# Patient Record
Sex: MALE | Race: WHITE | NOT HISPANIC OR LATINO | Employment: FULL TIME | URBAN - METROPOLITAN AREA
[De-identification: names, ages, dates, MRNs, and addresses within clinical notes are randomized per-mention and may not be internally consistent; named-entity substitution may affect disease eponyms.]

---

## 2017-11-20 ENCOUNTER — ALLSCRIPTS OFFICE VISIT (OUTPATIENT)
Dept: OTHER | Facility: OTHER | Age: 52
End: 2017-11-20

## 2017-11-20 LAB
BILIRUB UR QL STRIP: NORMAL
CLARITY UR: NORMAL
COLOR UR: YELLOW
GLUCOSE (HISTORICAL): NORMAL
HGB UR QL STRIP.AUTO: NORMAL
KETONES UR STRIP-MCNC: NORMAL MG/DL
LEUKOCYTE ESTERASE UR QL STRIP: NORMAL
NITRITE UR QL STRIP: NORMAL
PH UR STRIP.AUTO: 6.5 [PH]
PROT UR STRIP-MCNC: NORMAL MG/DL
SP GR UR STRIP.AUTO: 1.01
UROBILINOGEN UR QL STRIP.AUTO: 0.2

## 2018-01-13 VITALS
BODY MASS INDEX: 33.59 KG/M2 | HEIGHT: 72 IN | SYSTOLIC BLOOD PRESSURE: 130 MMHG | DIASTOLIC BLOOD PRESSURE: 84 MMHG | WEIGHT: 248 LBS

## 2018-11-20 DIAGNOSIS — N40.0 ENLARGED PROSTATE WITHOUT LOWER URINARY TRACT SYMPTOMS (LUTS): ICD-10-CM

## 2018-11-23 DIAGNOSIS — N40.0 BENIGN PROSTATIC HYPERPLASIA WITHOUT LOWER URINARY TRACT SYMPTOMS: Primary | ICD-10-CM

## 2018-11-29 ENCOUNTER — OFFICE VISIT (OUTPATIENT)
Dept: UROLOGY | Facility: MEDICAL CENTER | Age: 53
End: 2018-11-29
Payer: COMMERCIAL

## 2018-11-29 VITALS
BODY MASS INDEX: 33.86 KG/M2 | DIASTOLIC BLOOD PRESSURE: 80 MMHG | SYSTOLIC BLOOD PRESSURE: 120 MMHG | HEART RATE: 63 BPM | WEIGHT: 250 LBS | HEIGHT: 72 IN

## 2018-11-29 DIAGNOSIS — N52.03 COMBINED ARTERIAL INSUFFICIENCY AND CORPORO-VENOUS OCCLUSIVE ERECTILE DYSFUNCTION: ICD-10-CM

## 2018-11-29 DIAGNOSIS — N40.0 BENIGN PROSTATIC HYPERPLASIA WITHOUT LOWER URINARY TRACT SYMPTOMS: Primary | ICD-10-CM

## 2018-11-29 PROCEDURE — 99214 OFFICE O/P EST MOD 30 MIN: CPT | Performed by: UROLOGY

## 2018-11-29 RX ORDER — LOSARTAN POTASSIUM 50 MG/1
50 TABLET ORAL DAILY
Refills: 1 | COMMUNITY
Start: 2018-10-30

## 2018-11-29 RX ORDER — ALLOPURINOL 300 MG/1
300 TABLET ORAL DAILY
Refills: 2 | COMMUNITY
Start: 2018-09-30

## 2018-11-29 RX ORDER — SILDENAFIL CITRATE 20 MG/1
3 TABLET ORAL
COMMUNITY
Start: 2017-11-20 | End: 2018-11-29 | Stop reason: SDUPTHER

## 2018-11-29 RX ORDER — SILDENAFIL CITRATE 20 MG/1
60 TABLET ORAL AS NEEDED
Qty: 90 TABLET | Refills: 6 | Status: SHIPPED | OUTPATIENT
Start: 2018-11-29 | End: 2019-12-09 | Stop reason: SDUPTHER

## 2018-11-29 NOTE — LETTER
November 29, 2018     Ciara Oleary DO  [address] [facility] [id number]    Patient: Kristin Almendarez   YOB: 1965   Date of Visit: 11/29/2018       Dear Dr Kady Daiors: Thank you for referring Ld Barney to me for evaluation  Below are my notes for this consultation  If you have questions, please do not hesitate to call me  I look forward to following your patient along with you  Sincerely,        Earline Boggs MD        CC: No Recipients  Earline Boggs MD  11/29/2018  9:47 AM  Sign at close encounter  Assessment/Plan:  1  BPH without obstructive symptoms-no intervention-repeat PSA and ANGIE in 1 year    2  Erectile dysfunction-mild-sildenafil prescription rear issued  No problem-specific Assessment & Plan notes found for this encounter  Diagnoses and all orders for this visit:    Benign prostatic hyperplasia without lower urinary tract symptoms  -     PSA Total, Diagnostic; Future  -     Comprehensive metabolic panel; Future    Combined arterial insufficiency and corporo-venous occlusive erectile dysfunction  -     sildenafil (REVATIO) 20 mg tablet; Take 3 tablets (60 mg total) by mouth as needed (Erectile dysfunction)    Other orders  -     allopurinol (ZYLOPRIM) 300 mg tablet; Take 300 mg by mouth daily  -     losartan (COZAAR) 50 mg tablet; Take 50 mg by mouth daily  -     Multiple Vitamins-Minerals (MULTIVITAMIN ADULT PO); DAILY  -     Discontinue: sildenafil (REVATIO) 20 mg tablet; Take 3 tablets by mouth          Subjective:      Patient ID: Kristin Almendarez is a 48 y o  male  Chief complaint:  Erectile dysfunction and prostate enlargement    History of present illness this is a 51-year-old male who presents for prostate examination    From urinary standpoint he notes that by enlarged he is voiding well has an AUA symptom score of 4 noting some mild urinary frequency and mild weakening of the urinary stream   Patient notes nocturia approximately once per night  He denies dysuria gross hematuria and incontinence of urine  The patient also notes mild erectile dysfunction with decreased erectile rigidity and early loss of erection which is treated easily by utilizing 40-60 mg of sildenafil as needed  That prescription will be renewed  He denies any side effects from that  The following portions of the patient's history were reviewed and updated as appropriate: allergies, current medications, past family history, past medical history, past social history, past surgical history and problem list     Review of Systems   Constitutional: Negative  HENT: Negative  Respiratory: Negative  Cardiovascular: Negative  Gastrointestinal: Negative  Endocrine: Negative  Genitourinary:        See HPI   Musculoskeletal: Negative  Neurological: Negative  Psychiatric/Behavioral: Negative  Objective:      /80 (BP Location: Left arm, Patient Position: Sitting, Cuff Size: Standard)   Pulse 63   Ht 6' (1 829 m)   Wt 113 kg (250 lb)   BMI 33 91 kg/m²           Physical Exam   Constitutional: He is oriented to person, place, and time  He appears well-nourished  HENT:   Head: Atraumatic  Eyes: EOM are normal    Neck: Neck supple  Pulmonary/Chest: Effort normal    Abdominal: Soft  Genitourinary:   Genitourinary Comments: Phallus normal circumcised without cutaneous lesions  Meatus patent and normally placed  Testes adnexa and phallus all within normal limits  There are no scrotal cutaneous lesions noted  Testes and adnexa are palpably normal without masses or induration  Digital rectal examination reveals a normal anal verge normal anal sphincter tone no palpable rectal masses and the prostate is approximately 20-25 g a nodular nontender  Neurological: He is alert and oriented to person, place, and time  Psychiatric: He has a normal mood and affect   His behavior is normal  Judgment and thought content normal    Vitals reviewed

## 2018-11-29 NOTE — PROGRESS NOTES
Assessment/Plan:  1  BPH without obstructive symptoms-no intervention-repeat PSA and ANGIE in 1 year    2  Erectile dysfunction-mild-sildenafil prescription rear issued  No problem-specific Assessment & Plan notes found for this encounter  Diagnoses and all orders for this visit:    Benign prostatic hyperplasia without lower urinary tract symptoms  -     PSA Total, Diagnostic; Future  -     Comprehensive metabolic panel; Future    Combined arterial insufficiency and corporo-venous occlusive erectile dysfunction  -     sildenafil (REVATIO) 20 mg tablet; Take 3 tablets (60 mg total) by mouth as needed (Erectile dysfunction)    Other orders  -     allopurinol (ZYLOPRIM) 300 mg tablet; Take 300 mg by mouth daily  -     losartan (COZAAR) 50 mg tablet; Take 50 mg by mouth daily  -     Multiple Vitamins-Minerals (MULTIVITAMIN ADULT PO); DAILY  -     Discontinue: sildenafil (REVATIO) 20 mg tablet; Take 3 tablets by mouth          Subjective:      Patient ID: Eduardo De La Garza is a 48 y o  male  Chief complaint:  Erectile dysfunction and prostate enlargement    History of present illness this is a 70-year-old male who presents for prostate examination  From urinary standpoint he notes that by enlarged he is voiding well has an AUA symptom score of 4 noting some mild urinary frequency and mild weakening of the urinary stream   Patient notes nocturia approximately once per night  He denies dysuria gross hematuria and incontinence of urine  The patient also notes mild erectile dysfunction with decreased erectile rigidity and early loss of erection which is treated easily by utilizing 40-60 mg of sildenafil as needed  That prescription will be renewed  He denies any side effects from that          The following portions of the patient's history were reviewed and updated as appropriate: allergies, current medications, past family history, past medical history, past social history, past surgical history and problem list     Review of Systems   Constitutional: Negative  HENT: Negative  Respiratory: Negative  Cardiovascular: Negative  Gastrointestinal: Negative  Endocrine: Negative  Genitourinary:        See HPI   Musculoskeletal: Negative  Neurological: Negative  Psychiatric/Behavioral: Negative  Objective:      /80 (BP Location: Left arm, Patient Position: Sitting, Cuff Size: Standard)   Pulse 63   Ht 6' (1 829 m)   Wt 113 kg (250 lb)   BMI 33 91 kg/m²          Physical Exam   Constitutional: He is oriented to person, place, and time  He appears well-nourished  HENT:   Head: Atraumatic  Eyes: EOM are normal    Neck: Neck supple  Pulmonary/Chest: Effort normal    Abdominal: Soft  Genitourinary:   Genitourinary Comments: Phallus normal circumcised without cutaneous lesions  Meatus patent and normally placed  Testes adnexa and phallus all within normal limits  There are no scrotal cutaneous lesions noted  Testes and adnexa are palpably normal without masses or induration  Digital rectal examination reveals a normal anal verge normal anal sphincter tone no palpable rectal masses and the prostate is approximately 20-25 g a nodular nontender  Neurological: He is alert and oriented to person, place, and time  Psychiatric: He has a normal mood and affect  His behavior is normal  Judgment and thought content normal    Vitals reviewed

## 2019-12-03 DIAGNOSIS — N40.0 BENIGN PROSTATIC HYPERPLASIA WITHOUT LOWER URINARY TRACT SYMPTOMS: Primary | ICD-10-CM

## 2019-12-09 ENCOUNTER — OFFICE VISIT (OUTPATIENT)
Dept: UROLOGY | Facility: MEDICAL CENTER | Age: 54
End: 2019-12-09
Payer: COMMERCIAL

## 2019-12-09 VITALS
DIASTOLIC BLOOD PRESSURE: 84 MMHG | SYSTOLIC BLOOD PRESSURE: 120 MMHG | WEIGHT: 257.8 LBS | BODY MASS INDEX: 34.92 KG/M2 | HEIGHT: 72 IN

## 2019-12-09 DIAGNOSIS — N40.0 BENIGN PROSTATIC HYPERPLASIA WITHOUT LOWER URINARY TRACT SYMPTOMS: Primary | ICD-10-CM

## 2019-12-09 DIAGNOSIS — N52.03 COMBINED ARTERIAL INSUFFICIENCY AND CORPORO-VENOUS OCCLUSIVE ERECTILE DYSFUNCTION: ICD-10-CM

## 2019-12-09 LAB
SL AMB  POCT GLUCOSE, UA: NORMAL
SL AMB LEUKOCYTE ESTERASE,UA: NORMAL
SL AMB POCT BILIRUBIN,UA: NORMAL
SL AMB POCT BLOOD,UA: NORMAL
SL AMB POCT CLARITY,UA: CLEAR
SL AMB POCT COLOR,UA: YELLOW
SL AMB POCT KETONES,UA: NORMAL
SL AMB POCT NITRITE,UA: NORMAL
SL AMB POCT PH,UA: 7
SL AMB POCT SPECIFIC GRAVITY,UA: 1.02
SL AMB POCT URINE PROTEIN: NORMAL
SL AMB POCT UROBILINOGEN: 0.2

## 2019-12-09 PROCEDURE — 99213 OFFICE O/P EST LOW 20 MIN: CPT | Performed by: UROLOGY

## 2019-12-09 PROCEDURE — 81003 URINALYSIS AUTO W/O SCOPE: CPT | Performed by: UROLOGY

## 2019-12-09 RX ORDER — SILDENAFIL CITRATE 20 MG/1
60 TABLET ORAL AS NEEDED
Qty: 90 TABLET | Refills: 6 | Status: SHIPPED | OUTPATIENT
Start: 2019-12-09 | End: 2021-01-13 | Stop reason: SDUPTHER

## 2019-12-09 NOTE — PROGRESS NOTES
Assessment/Plan:  1  BPH without obstructive symptoms -AUA symptom score 4 with nocturia once per night and 1/5 for frequency  Urgency and weakening of the stream   -No intervention at this time-PSA normal as is ANGIE except for enlargement - repeat PSA Chem profile in 1 year ANGIE    2  Erectile dysfunction patient responding nicely to 40-60 mg sildenafil without side effects prescription refilled  No problem-specific Assessment & Plan notes found for this encounter  Diagnoses and all orders for this visit:    Benign prostatic hyperplasia without lower urinary tract symptoms  -     POCT urine dip auto non-scope  -     PSA Total, Diagnostic; Future  -     Comprehensive metabolic panel; Future    Combined arterial insufficiency and corporo-venous occlusive erectile dysfunction  -     sildenafil (REVATIO) 20 mg tablet; Take 3 tablets (60 mg total) by mouth as needed (Erectile dysfunction)          Subjective:      Patient ID: Buddy Ferreira is a 48 y o  male  HPI    72-year-old male presents for follow-up of prostate enlargement and erectile dysfunction  PSA is within normal limits as is chemistry profile except for an isolated elevation and 1 liver function test and a potassium of 5 5  The patient is due for blood work with his PCP in this will be repeated and later time  Patient notes mild nocturia once nightly with mild weakening of the urinary stream frequency and urgency is is only symptomatology  This is been stable over the past year with the patient  Also denying gross hematuria dysuria  Patient also notes erectile dysfunction with an excellent response to sildenafil  He requests refill prescription    The following portions of the patient's history were reviewed and updated as appropriate: allergies, current medications, past family history, past medical history, past social history, past surgical history and problem list     Review of Systems   Genitourinary:        See HPI and AUA symptom score   All other systems reviewed and are negative  Objective:      /84   Ht 6' (1 829 m)   Wt 117 kg (257 lb 12 8 oz)   BMI 34 96 kg/m²          Physical Exam   Constitutional: He is oriented to person, place, and time  He appears well-developed and well-nourished  No distress  HENT:   Head: Atraumatic  Eyes: EOM are normal    Neck: Normal range of motion  Neck supple  Cardiovascular: Normal rate  Pulmonary/Chest: Effort normal    Abdominal: Soft  Genitourinary:   Genitourinary Comments: Phallus normal without cutaneous lesions, circumcised, meatus patent normally placed  Scrotum normal without cutaneous lesions  Testes adnexa palpably normal without masses induration tenderness    ANGIE normal anal verge normal anal sphincter tone no palpable rectal masses prostate 30 g a nodular nontender   Neurological: He is alert and oriented to person, place, and time  Psychiatric: He has a normal mood and affect  His behavior is normal  Judgment and thought content normal    Vitals reviewed

## 2021-01-05 ENCOUNTER — TELEPHONE (OUTPATIENT)
Dept: UROLOGY | Facility: MEDICAL CENTER | Age: 56
End: 2021-01-05

## 2021-01-05 DIAGNOSIS — N40.0 BENIGN PROSTATIC HYPERPLASIA WITHOUT LOWER URINARY TRACT SYMPTOMS: Primary | ICD-10-CM

## 2021-01-13 ENCOUNTER — OFFICE VISIT (OUTPATIENT)
Dept: UROLOGY | Facility: MEDICAL CENTER | Age: 56
End: 2021-01-13
Payer: COMMERCIAL

## 2021-01-13 VITALS
HEIGHT: 72 IN | BODY MASS INDEX: 34.27 KG/M2 | SYSTOLIC BLOOD PRESSURE: 128 MMHG | WEIGHT: 253 LBS | DIASTOLIC BLOOD PRESSURE: 74 MMHG

## 2021-01-13 DIAGNOSIS — N40.0 BENIGN PROSTATIC HYPERPLASIA WITHOUT LOWER URINARY TRACT SYMPTOMS: Primary | ICD-10-CM

## 2021-01-13 DIAGNOSIS — N52.03 COMBINED ARTERIAL INSUFFICIENCY AND CORPORO-VENOUS OCCLUSIVE ERECTILE DYSFUNCTION: ICD-10-CM

## 2021-01-13 LAB
SL AMB  POCT GLUCOSE, UA: NORMAL
SL AMB LEUKOCYTE ESTERASE,UA: NORMAL
SL AMB POCT BILIRUBIN,UA: NORMAL
SL AMB POCT BLOOD,UA: NORMAL
SL AMB POCT CLARITY,UA: CLEAR
SL AMB POCT COLOR,UA: YELLOW
SL AMB POCT KETONES,UA: NORMAL
SL AMB POCT NITRITE,UA: NORMAL
SL AMB POCT PH,UA: 7
SL AMB POCT SPECIFIC GRAVITY,UA: 1.01
SL AMB POCT URINE PROTEIN: NORMAL
SL AMB POCT UROBILINOGEN: 0.2

## 2021-01-13 PROCEDURE — 99214 OFFICE O/P EST MOD 30 MIN: CPT | Performed by: UROLOGY

## 2021-01-13 PROCEDURE — 81003 URINALYSIS AUTO W/O SCOPE: CPT | Performed by: UROLOGY

## 2021-01-13 RX ORDER — SILDENAFIL CITRATE 20 MG/1
60 TABLET ORAL AS NEEDED
Qty: 90 TABLET | Refills: 6 | Status: SHIPPED | OUTPATIENT
Start: 2021-01-13 | End: 2022-03-30

## 2021-01-13 RX ORDER — ALBUTEROL SULFATE 90 UG/1
AEROSOL, METERED RESPIRATORY (INHALATION)
COMMUNITY
Start: 2020-10-31

## 2021-01-13 NOTE — PROGRESS NOTES
Assessment/Plan:  1  BPH without obstructive symptoms -AUA symptom score for with nocturia once nightly  No intervention  2  Erectile dysfunction responding to sildenafil 40 mg on demand  Prescription renewed  No problem-specific Assessment & Plan notes found for this encounter  Diagnoses and all orders for this visit:    Benign prostatic hyperplasia without lower urinary tract symptoms  -     POCT urine dip auto non-scope  -     PSA Total, Diagnostic; Future  -     Comprehensive metabolic panel; Future    Combined arterial insufficiency and corporo-venous occlusive erectile dysfunction  -     sildenafil (REVATIO) 20 mg tablet; Take 3 tablets (60 mg total) by mouth as needed (Erectile dysfunction)    Other orders  -     albuterol (PROVENTIL HFA,VENTOLIN HFA) 90 mcg/act inhaler; INHALE 2 PUFFS EVERY 6 HOURS AS NEEDED (WHEEZING, COUGH, SHORTNESS OF BREATH)  Subjective:      Patient ID: Judy Espinal is a 50 y o  male  HPI    14-year-old male presents for prostate examination and follow-up of erectile dysfunction  The patient is maintained on sildenafil 60 mg on demand an excellent response  Prescription requested for refill  The patient denies any significant urinary problems with 1/5 for urinary frequency weakening of the stream and nocturia as well as incomplete emptying  The symptoms however not severe and overall the patient is satisfied in fact pleased with his voiding pattern  He denies dysuria gross hematuria incontinence  The following portions of the patient's history were reviewed and updated as appropriate: allergies, current medications, past family history, past medical history, past social history, past surgical history and problem list     Review of Systems   All other systems reviewed and are negative  Objective:      /74   Ht 6' (1 829 m)   Wt 115 kg (253 lb)   BMI 34 31 kg/m²          Physical Exam  Vitals signs reviewed     Constitutional: General: He is not in acute distress  Appearance: Normal appearance  He is not ill-appearing, toxic-appearing or diaphoretic  HENT:      Head: Normocephalic and atraumatic  Mouth/Throat:      Mouth: Mucous membranes are moist    Eyes:      Extraocular Movements: Extraocular movements intact  Neck:      Musculoskeletal: Normal range of motion  Pulmonary:      Effort: Pulmonary effort is normal  No respiratory distress  Abdominal:      Palpations: Abdomen is soft  Genitourinary:     Scrotum/Testes: Normal       Rectum: Normal       Comments: Phallus normal circumcised without cutaneous lesions  Meatus patent normally placed  Testes adnexa palpably normal without masses induration tenderness except for some nodularity of the tail of the epididymis on the left consistent with a stable epididymal cyst   ANGIE normal anal verge normal anal sphincter tone no palpable rectal masses  Prostate 30 g a nodular nontender  Musculoskeletal: Normal range of motion  Neurological:      General: No focal deficit present  Mental Status: He is alert and oriented to person, place, and time  Mental status is at baseline  Psychiatric:         Mood and Affect: Mood normal          Behavior: Behavior normal          Thought Content:  Thought content normal          Judgment: Judgment normal

## 2022-03-23 DIAGNOSIS — N40.0 BENIGN PROSTATIC HYPERPLASIA WITHOUT LOWER URINARY TRACT SYMPTOMS: Primary | ICD-10-CM

## 2022-03-24 ENCOUNTER — APPOINTMENT (OUTPATIENT)
Dept: LAB | Age: 57
End: 2022-03-24
Payer: COMMERCIAL

## 2022-03-24 DIAGNOSIS — N40.0 BENIGN PROSTATIC HYPERPLASIA WITHOUT LOWER URINARY TRACT SYMPTOMS: ICD-10-CM

## 2022-03-24 LAB
ALBUMIN SERPL BCP-MCNC: 4.1 G/DL (ref 3.5–5)
ALP SERPL-CCNC: 76 U/L (ref 46–116)
ALT SERPL W P-5'-P-CCNC: 54 U/L (ref 12–78)
ANION GAP SERPL CALCULATED.3IONS-SCNC: 5 MMOL/L (ref 4–13)
AST SERPL W P-5'-P-CCNC: 31 U/L (ref 5–45)
BILIRUB SERPL-MCNC: 1.33 MG/DL (ref 0.2–1)
BUN SERPL-MCNC: 14 MG/DL (ref 5–25)
CALCIUM SERPL-MCNC: 9.5 MG/DL (ref 8.3–10.1)
CHLORIDE SERPL-SCNC: 105 MMOL/L (ref 100–108)
CO2 SERPL-SCNC: 28 MMOL/L (ref 21–32)
CREAT SERPL-MCNC: 1.07 MG/DL (ref 0.6–1.3)
GFR SERPL CREATININE-BSD FRML MDRD: 78 ML/MIN/1.73SQ M
GLUCOSE P FAST SERPL-MCNC: 111 MG/DL (ref 65–99)
POTASSIUM SERPL-SCNC: 4.4 MMOL/L (ref 3.5–5.3)
PROT SERPL-MCNC: 7.6 G/DL (ref 6.4–8.2)
PSA SERPL-MCNC: 1 NG/ML (ref 0–4)
SODIUM SERPL-SCNC: 138 MMOL/L (ref 136–145)

## 2022-03-24 PROCEDURE — 84153 ASSAY OF PSA TOTAL: CPT

## 2022-03-24 PROCEDURE — 36415 COLL VENOUS BLD VENIPUNCTURE: CPT

## 2022-03-24 PROCEDURE — 80053 COMPREHEN METABOLIC PANEL: CPT

## 2022-03-30 ENCOUNTER — OFFICE VISIT (OUTPATIENT)
Dept: UROLOGY | Facility: MEDICAL CENTER | Age: 57
End: 2022-03-30
Payer: COMMERCIAL

## 2022-03-30 VITALS
BODY MASS INDEX: 33.72 KG/M2 | WEIGHT: 249 LBS | SYSTOLIC BLOOD PRESSURE: 122 MMHG | DIASTOLIC BLOOD PRESSURE: 80 MMHG | HEIGHT: 72 IN

## 2022-03-30 DIAGNOSIS — L72.3 SEBACEOUS CYST: ICD-10-CM

## 2022-03-30 DIAGNOSIS — N52.03 COMBINED ARTERIAL INSUFFICIENCY AND CORPORO-VENOUS OCCLUSIVE ERECTILE DYSFUNCTION: ICD-10-CM

## 2022-03-30 DIAGNOSIS — Z12.5 PROSTATE CANCER SCREENING: ICD-10-CM

## 2022-03-30 DIAGNOSIS — N40.0 BENIGN PROSTATIC HYPERPLASIA WITHOUT LOWER URINARY TRACT SYMPTOMS: Primary | ICD-10-CM

## 2022-03-30 LAB
SL AMB  POCT GLUCOSE, UA: NORMAL
SL AMB LEUKOCYTE ESTERASE,UA: NORMAL
SL AMB POCT BILIRUBIN,UA: NORMAL
SL AMB POCT BLOOD,UA: NORMAL
SL AMB POCT CLARITY,UA: CLEAR
SL AMB POCT COLOR,UA: YELLOW
SL AMB POCT KETONES,UA: NORMAL
SL AMB POCT NITRITE,UA: NORMAL
SL AMB POCT PH,UA: 5.5
SL AMB POCT SPECIFIC GRAVITY,UA: 1.03
SL AMB POCT URINE PROTEIN: NORMAL
SL AMB POCT UROBILINOGEN: 0.2

## 2022-03-30 PROCEDURE — 81003 URINALYSIS AUTO W/O SCOPE: CPT | Performed by: UROLOGY

## 2022-03-30 PROCEDURE — 99214 OFFICE O/P EST MOD 30 MIN: CPT | Performed by: UROLOGY

## 2022-03-30 RX ORDER — SILDENAFIL 50 MG/1
50 TABLET, FILM COATED ORAL AS NEEDED
Qty: 30 TABLET | Refills: 5 | Status: SHIPPED | OUTPATIENT
Start: 2022-03-30

## 2022-03-30 NOTE — PROGRESS NOTES
Assessment/Plan:   1  BPH without lower urinary tract symptoms-AUA symptom score 6 with nocturia proximally twice nightly  Overall pleased with voiding pattern-no intervention    2  Erectile dysfunction-responsive to sildenafil-prescription reassured    3  Prostate cancer screening-ANGIE and PSA not suspicious  Repeat 1 year  4  Asymptomatic sebaceous cyst left side of scrotum  No intervention at this point unless it gets larger becomes symptomatic  No problem-specific Assessment & Plan notes found for this encounter  Diagnoses and all orders for this visit:    Benign prostatic hyperplasia without lower urinary tract symptoms  -     POCT urine dip auto non-scope  -     PSA Total, Diagnostic; Future  -     Comprehensive metabolic panel; Future    Combined arterial insufficiency and corporo-venous occlusive erectile dysfunction  -     Comprehensive metabolic panel; Future    Prostate cancer screening  -     PSA Total, Diagnostic; Future    Sebaceous cyst          Subjective:      Patient ID: Dena Ahumada is a 49 y o  male  HPI  82-year-old male with mild prostatic enlargement an AUA symptom score of 6 with nocturia twice nightly and erectile dysfunction responding to sildenafil approximately 40-60 mg as needed requesting refill  PSA is within normal limits and overall the patient is pleased with his voiding denying dysuria gross hematuria or incontinence  The following portions of the patient's history were reviewed and updated as appropriate: allergies, current medications, past family history, past medical history, past social history, past surgical history and problem list     Review of Systems   Genitourinary: Positive for frequency  All other systems reviewed and are negative  Objective:      /80   Ht 6' (1 829 m)   Wt 113 kg (249 lb)   BMI 33 77 kg/m²          Physical Exam  Vitals reviewed  Constitutional:       General: He is not in acute distress       Appearance: Normal appearance  He is obese  He is not ill-appearing, toxic-appearing or diaphoretic  HENT:      Head: Normocephalic and atraumatic  Mouth/Throat:      Mouth: Mucous membranes are moist    Eyes:      Extraocular Movements: Extraocular movements intact  Pulmonary:      Effort: Pulmonary effort is normal  No respiratory distress  Genitourinary:     Comments: Phallus normal without cutaneous lesions, circumcised, meatus patent normally placed  Scrotum normal without cutaneous lesions except for a 1/2 cm nontender sebaceous cysts located at the perineal junction of the scrotum on the patient's left  Testes adnexa palpably normal without masses or fluid collection  ANGIE normal anal verge normal anal sphincter tone no palpable rectal masses  Prostate approximately 30 g a nodular nontender  Skin:     General: Skin is dry  Neurological:      Mental Status: He is alert and oriented to person, place, and time  Psychiatric:         Mood and Affect: Mood normal          Behavior: Behavior normal          Thought Content:  Thought content normal          Judgment: Judgment normal

## 2023-01-18 ENCOUNTER — TELEPHONE (OUTPATIENT)
Dept: UROLOGY | Facility: AMBULATORY SURGERY CENTER | Age: 58
End: 2023-01-18

## 2023-01-18 DIAGNOSIS — N40.0 BENIGN PROSTATIC HYPERPLASIA WITHOUT LOWER URINARY TRACT SYMPTOMS: Primary | ICD-10-CM

## 2023-01-18 NOTE — TELEPHONE ENCOUNTER
Patient has a yearly follow up with Dr Sonya Fairchild on 5/3/23 at 10 am in [facility]  Patient's PSA and CMP are expected on the same day they are   Patient will need new orders  Patient does not need a call back

## 2023-04-27 ENCOUNTER — TELEPHONE (OUTPATIENT)
Dept: UROLOGY | Facility: MEDICAL CENTER | Age: 58
End: 2023-04-27

## 2023-04-28 ENCOUNTER — APPOINTMENT (OUTPATIENT)
Dept: LAB | Age: 58
End: 2023-04-28

## 2023-04-28 DIAGNOSIS — N40.0 BENIGN PROSTATIC HYPERPLASIA WITHOUT LOWER URINARY TRACT SYMPTOMS: ICD-10-CM

## 2023-04-28 LAB
ALBUMIN SERPL BCP-MCNC: 3.8 G/DL (ref 3.5–5)
ALP SERPL-CCNC: 73 U/L (ref 46–116)
ALT SERPL W P-5'-P-CCNC: 64 U/L (ref 12–78)
ANION GAP SERPL CALCULATED.3IONS-SCNC: 4 MMOL/L (ref 4–13)
AST SERPL W P-5'-P-CCNC: 43 U/L (ref 5–45)
BILIRUB SERPL-MCNC: 0.45 MG/DL (ref 0.2–1)
BUN SERPL-MCNC: 18 MG/DL (ref 5–25)
CALCIUM SERPL-MCNC: 9.5 MG/DL (ref 8.3–10.1)
CHLORIDE SERPL-SCNC: 106 MMOL/L (ref 96–108)
CO2 SERPL-SCNC: 27 MMOL/L (ref 21–32)
CREAT SERPL-MCNC: 1.01 MG/DL (ref 0.6–1.3)
GFR SERPL CREATININE-BSD FRML MDRD: 83 ML/MIN/1.73SQ M
GLUCOSE P FAST SERPL-MCNC: 113 MG/DL (ref 65–99)
POTASSIUM SERPL-SCNC: 4.2 MMOL/L (ref 3.5–5.3)
PROT SERPL-MCNC: 7.6 G/DL (ref 6.4–8.4)
PSA SERPL-MCNC: 0.8 NG/ML (ref 0–4)
SODIUM SERPL-SCNC: 137 MMOL/L (ref 135–147)

## 2023-05-03 ENCOUNTER — OFFICE VISIT (OUTPATIENT)
Dept: UROLOGY | Facility: MEDICAL CENTER | Age: 58
End: 2023-05-03

## 2023-05-03 VITALS
SYSTOLIC BLOOD PRESSURE: 120 MMHG | HEIGHT: 72 IN | HEART RATE: 65 BPM | BODY MASS INDEX: 34.4 KG/M2 | DIASTOLIC BLOOD PRESSURE: 70 MMHG | WEIGHT: 254 LBS

## 2023-05-03 DIAGNOSIS — N40.0 BENIGN PROSTATIC HYPERPLASIA WITHOUT LOWER URINARY TRACT SYMPTOMS: ICD-10-CM

## 2023-05-03 DIAGNOSIS — N52.03 COMBINED ARTERIAL INSUFFICIENCY AND CORPORO-VENOUS OCCLUSIVE ERECTILE DYSFUNCTION: ICD-10-CM

## 2023-05-03 DIAGNOSIS — Z12.5 PROSTATE CANCER SCREENING: Primary | ICD-10-CM

## 2023-05-03 NOTE — PROGRESS NOTES
Assessment/Plan:  #1  Prostate cancer screening-PSA and ANGIE not suspicious-repeat 1 year  2   BPH without lower urinary tract symptoms-AUA symptom score 5  Patient voiding well  3   Erectile dysfunction continues to be responsive to sildenafil  4   Sebaceous cyst of scrotum-completely stable and asymptomatic-no intervention  No problem-specific Assessment & Plan notes found for this encounter  Diagnoses and all orders for this visit:    Prostate cancer screening  -     PSA Total, Diagnostic; Future    Benign prostatic hyperplasia without lower urinary tract symptoms  -     PSA Total, Diagnostic; Future    Combined arterial insufficiency and corporo-venous occlusive erectile dysfunction  -     Comprehensive metabolic panel; Future          Subjective:      Patient ID: Naila Mcfarland is a 56 y o  male  HPI  61-year-old male with prostatic enlargement and an AUA symptom score 5 with nocturia and mild erectile dysfunction responsive to sildenafil approximately 50 mg as needed with normal PSA denying any significant voiding symptomatology as well as denying gross hematuria dysuria presents for evaluation  The following portions of the patient's history were reviewed and updated as appropriate: allergies, current medications, past family history, past medical history, past social history, past surgical history and problem list     Review of Systems   Genitourinary:        AUA symptom score 5   All other systems reviewed and are negative  Objective:      /70   Pulse 65   Ht 6' (1 829 m)   Wt 115 kg (254 lb)   BMI 34 45 kg/m²          Physical Exam  Vitals reviewed  Constitutional:       General: He is not in acute distress  Appearance: Normal appearance  He is not ill-appearing, toxic-appearing or diaphoretic  HENT:      Head: Normocephalic and atraumatic        Nose: Nose normal       Mouth/Throat:      Mouth: Mucous membranes are moist    Eyes:      Extraocular Movements: Extraocular movements intact  Pulmonary:      Effort: Pulmonary effort is normal  No respiratory distress  Abdominal:      Palpations: Abdomen is soft  Genitourinary:     Comments: Phallus normal without cutaneous lesions  Meatus patent normally placed  Scrotum normal without cutaneous lesions  Testes somewhat on the smaller side with left smaller than right  Nontender  Adnexa normal   ANGIE normal anal verge normal anal sphincter tone no palpable rectal masses  Prostate approximately 35 to 40 g a nodular nontender  Musculoskeletal:      Cervical back: Neck supple  Skin:     General: Skin is warm  Neurological:      Mental Status: He is alert and oriented to person, place, and time  Psychiatric:         Mood and Affect: Mood normal          Behavior: Behavior normal          Thought Content:  Thought content normal          Judgment: Judgment normal

## 2023-10-13 DIAGNOSIS — N52.03 COMBINED ARTERIAL INSUFFICIENCY AND CORPORO-VENOUS OCCLUSIVE ERECTILE DYSFUNCTION: ICD-10-CM

## 2023-10-13 RX ORDER — SILDENAFIL 50 MG/1
50 TABLET, FILM COATED ORAL AS NEEDED
Qty: 30 TABLET | Refills: 1 | Status: SHIPPED | OUTPATIENT
Start: 2023-10-13

## 2023-10-13 NOTE — TELEPHONE ENCOUNTER
Reason for call:   [x] Refill   [] Prior Auth  [] Other:     Office:   [] PCP/Provider -   [x] Speciality/Provider -     Medication: Sildenafil 50 mg- Take 1 tab by  mouth PRN for erectile dysnfunction. Quantity: 30    Pharmacy: [facility]     Does the patient have enough for 3 days?    [] Yes   [x] No - Send as HP to POD

## 2024-03-13 DIAGNOSIS — N52.03 COMBINED ARTERIAL INSUFFICIENCY AND CORPORO-VENOUS OCCLUSIVE ERECTILE DYSFUNCTION: ICD-10-CM

## 2024-03-13 RX ORDER — SILDENAFIL 50 MG/1
50 TABLET, FILM COATED ORAL AS NEEDED
Qty: 30 TABLET | Refills: 5 | Status: SHIPPED | OUTPATIENT
Start: 2024-03-13

## 2024-11-20 ENCOUNTER — OFFICE VISIT (OUTPATIENT)
Dept: UROLOGY | Facility: MEDICAL CENTER | Age: 59
End: 2024-11-20
Payer: COMMERCIAL

## 2024-11-20 VITALS
DIASTOLIC BLOOD PRESSURE: 70 MMHG | BODY MASS INDEX: 34.95 KG/M2 | SYSTOLIC BLOOD PRESSURE: 110 MMHG | WEIGHT: 258 LBS | HEIGHT: 72 IN | HEART RATE: 72 BPM | OXYGEN SATURATION: 98 %

## 2024-11-20 DIAGNOSIS — N40.0 BENIGN PROSTATIC HYPERPLASIA WITHOUT LOWER URINARY TRACT SYMPTOMS: Primary | ICD-10-CM

## 2024-11-20 DIAGNOSIS — N52.03 COMBINED ARTERIAL INSUFFICIENCY AND CORPORO-VENOUS OCCLUSIVE ERECTILE DYSFUNCTION: ICD-10-CM

## 2024-11-20 DIAGNOSIS — Z12.5 PROSTATE CANCER SCREENING: ICD-10-CM

## 2024-11-20 PROCEDURE — 99214 OFFICE O/P EST MOD 30 MIN: CPT | Performed by: UROLOGY

## 2024-11-20 RX ORDER — SILDENAFIL 50 MG/1
50 TABLET, FILM COATED ORAL AS NEEDED
Qty: 30 TABLET | Refills: 5 | Status: SHIPPED | OUTPATIENT
Start: 2024-11-20

## 2024-11-20 NOTE — LETTER
2024     Blue Landaverde DO  [address] [facility] [id number]    Patient: Geronimo Owens   YOB: 1965   Date of Visit: 2024       Dear Dr. Landaverde:    Thank you for referring Geronimo Owens to me for evaluation. Below are my notes for this consultation.    If you have questions, please do not hesitate to call me. I look forward to following your patient along with you.         Sincerely,        Daniele Moran MD        CC: No Recipients    Daniele Moran MD  2024  4:19 PM  Sign when Signing Visit  Name: Geronimo Owens      : 1965      MRN: [id number]  Encounter Provider: Daniele Moran MD  Encounter Date: 2024   [facility]  :  Assessment & Plan  Benign prostatic hyperplasia without lower urinary tract symptoms  Voiding well with AUA symptom score of 8.-No intervention  Orders:  •  PSA Total, Diagnostic; Future  •  Comprehensive metabolic panel; Future    Prostate cancer screening  PSA and ANGIE not suspicious for malignancy-repeat yearly  Orders:  •  PSA Total, Diagnostic; Future    Combined arterial insufficiency and corporo-venous occlusive erectile dysfunction  Using Viagra 25 mg on demand with excellent results.  Higher doses of Viagra have caused dyspepsia  Orders:  •  Comprehensive metabolic panel; Future        History of Present Illness  Geronimo Owens is a 59 y.o. male who presents with prostatic enlargement and an AUA symptom score of 8 with some nocturia and mild erectile dysfunction responsive to sildenafil 25 mg as needed.  PSA is normal without any significant problematic voiding symptomatology.  The patient presents for follow-up.  AUA SYMPTOM SCORE      Flowsheet Row Most Recent Value   AUA SYMPTOM SCORE    How often have you had a sensation of not emptying your bladder completely after you finished urinating? 1 (P)     How often have you had to urinate again less  than two hours after you finished urinating? 2 (P)     How often have you found you stopped and started again several times when you urinate? 0 (P)     How often have you found it difficult to postpone urination? 1 (P)     How often have you had a weak urinary stream? 2 (P)     How often have you had to push or strain to begin urination? 1 (P)     How many times did you most typically get up to urinate from the time you went to bed at night until the time you got up in the morning? 1 (P)     Quality of Life: If you were to spend the rest of your life with your urinary condition just the way it is now, how would you feel about that? 2 (P)     AUA SYMPTOM SCORE 8 (P)            Review of Systems   All other systems reviewed and are negative.    Pertinent Medical History          Medical History Reviewed by provider this encounter:  Problems     .  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   reports that he has never smoked. He has never used smokeless tobacco. He reports current alcohol use. He reports that he does not use drugs.  Current Outpatient Medications on File Prior to Visit   Medication Sig Dispense Refill   • albuterol (PROVENTIL HFA,VENTOLIN HFA) 90 mcg/act inhaler INHALE 2 PUFFS EVERY 6 HOURS AS NEEDED (WHEEZING, COUGH, SHORTNESS OF BREATH).     • allopurinol (ZYLOPRIM) 300 mg tablet Take 300 mg by mouth daily  2   • losartan (COZAAR) 50 mg tablet Take 50 mg by mouth daily  1   • Multiple Vitamins-Minerals (MULTIVITAMIN ADULT PO) DAILY     • sildenafil (VIAGRA) 50 MG tablet TAKE  ONE TABLET BY MOUTH AS NEEDED FOR ERECTILE DYSFUNCTION. 30 tablet 5     No current facility-administered medications on file prior to visit.     Allergies   Allergen Reactions   • Lisinopril Cough   • Other      Seasonal allergies.      Current Outpatient Medications on File Prior to Visit   Medication Sig Dispense Refill   • albuterol (PROVENTIL HFA,VENTOLIN HFA) 90 mcg/act inhaler INHALE 2 PUFFS EVERY 6 HOURS AS NEEDED (WHEEZING, COUGH, SHORTNESS OF BREATH).     • allopurinol (ZYLOPRIM) 300 mg tablet Take 300 mg by mouth daily  2   • losartan (COZAAR) 50 mg tablet Take 50 mg by mouth daily  1   • Multiple Vitamins-Minerals (MULTIVITAMIN ADULT PO) DAILY     • sildenafil (VIAGRA) 50 MG tablet TAKE ONE TABLET BY MOUTH AS NEEDED FOR ERECTILE DYSFUNCTION. 30 tablet 5     No current facility-administered medications on file prior to visit.      Social History     Tobacco Use   • Smoking status: Never   • Smokeless tobacco: Never   Substance and Sexual Activity   • Alcohol use: Yes   • Drug use: No   • Sexual activity: Yes        Objective  /70 (BP Location: Left arm, Patient Position: Sitting, Cuff Size: Standard)   Pulse 72   Ht 6' (1.829 m)   Wt 117 kg (258 lb)   SpO2 98%   BMI 34.99 kg/m²     Physical Exam  Vitals reviewed.   Constitutional:       General: He is not in acute distress.     Appearance: Normal appearance. He is obese. He is not ill-appearing, toxic-appearing or diaphoretic.   HENT:      Head: Normocephalic and atraumatic.      Nose: Nose normal.      Mouth/Throat:      Mouth: Mucous membranes are moist.   Eyes:      Extraocular Movements: Extraocular movements intact.   Pulmonary:      Effort: Pulmonary effort is normal. No respiratory distress.   Abdominal:      Palpations: Abdomen is soft.   Genitourinary:     Penis: Normal.       Testes: Normal.      Prostate: Normal.      Rectum: Normal.      Comments: ANGIE 40 g a nodular nontender prostate normal anal verge normal rectum no palpable  "rectal masses.  Normal anal sphincter tone  Musculoskeletal:         General: Normal range of motion.      Cervical back: Neck supple.   Skin:     General: Skin is dry.   Neurological:      Mental Status: He is alert and oriented to person, place, and time.   Psychiatric:         Mood and Affect: Mood normal.         Behavior: Behavior normal.         Thought Content: Thought content normal.         Judgment: Judgment normal.           Results  Lab Results   Component Value Date    PSA 0.8 04/28/2023    PSA 1.0 03/24/2022     Lab Results   Component Value Date    CALCIUM 9.9 10/11/2024    K 4.9 10/11/2024    CO2 28 10/11/2024    CL 98 (L) 10/11/2024    BUN 15 10/11/2024    CREATININE 0.91 10/11/2024     No results found for: \"WBC\", \"HGB\", \"HCT\", \"MCV\", \"PLT\"    Office Urine Dip  No results found for this or any previous visit (from the past hour).]      "

## 2024-11-20 NOTE — PROGRESS NOTES
Name: Geronimo Owens      : 1965      MRN: [id number]  Encounter Provider: Daniele Moran MD  Encounter Date: 2024   [facility]  :  Assessment & Plan  Benign prostatic hyperplasia without lower urinary tract symptoms  Voiding well with AUA symptom score of 8.-No intervention  Orders:    PSA Total, Diagnostic; Future    Comprehensive metabolic panel; Future    Prostate cancer screening  PSA and ANGIE not suspicious for malignancy-repeat yearly  Orders:    PSA Total, Diagnostic; Future    Combined arterial insufficiency and corporo-venous occlusive erectile dysfunction  Using Viagra 25 mg on demand with excellent results.  Higher doses of Viagra have caused dyspepsia  Orders:    Comprehensive metabolic panel; Future        History of Present Illness   Geronimo Owens is a 59 y.o. male who presents with prostatic enlargement and an AUA symptom score of 8 with some nocturia and mild erectile dysfunction responsive to sildenafil 25 mg as needed.  PSA is normal without any significant problematic voiding symptomatology.  The patient presents for follow-up.  AUA SYMPTOM SCORE      Flowsheet Row Most Recent Value   AUA SYMPTOM SCORE    How often have you had a sensation of not emptying your bladder completely after you finished urinating? 1 (P)     How often have you had to urinate again less than two hours after you finished urinating? 2 (P)     How often have you found you stopped and started again several times when you urinate? 0 (P)     How often have you found it difficult to postpone urination? 1 (P)     How often have you had a weak urinary stream? 2 (P)     How often have you had to push or strain to begin urination? 1 (P)     How many times did you most typically get up to urinate from the time you went to bed at night until the time you got up in the morning? 1 (P)     Quality of Life: If you were to spend the rest of your life with your  urinary condition just the way it is now, how would you feel about that? 2 (P)     AUA SYMPTOM SCORE 8 (P)            Review of Systems   All other systems reviewed and are negative.    Pertinent Medical History           Medical History Reviewed by provider this encounter:  Problems     .  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   reports that he has never smoked. He has never used smokeless tobacco. He reports current alcohol use. He reports that he does not use drugs.  Current Outpatient Medications on File Prior to Visit   Medication Sig Dispense Refill    albuterol (PROVENTIL HFA,VENTOLIN HFA) 90 mcg/act inhaler INHALE 2 PUFFS EVERY 6 HOURS AS NEEDED (WHEEZING, COUGH, SHORTNESS OF BREATH).      allopurinol (ZYLOPRIM) 300 mg tablet Take 300 mg by mouth daily  2    losartan (COZAAR) 50 mg tablet Take 50 mg by mouth daily  1    Multiple Vitamins-Minerals (MULTIVITAMIN ADULT PO) DAILY      sildenafil (VIAGRA) 50 MG tablet TAKE ONE TABLET BY MOUTH AS NEEDED FOR ERECTILE DYSFUNCTION. 30 tablet 5     No current facility-administered medications on file prior to visit.     Allergies   Allergen Reactions    Lisinopril Cough    Other      Seasonal allergies.      Current Outpatient Medications on File Prior to Visit   Medication Sig Dispense Refill    albuterol (PROVENTIL HFA,VENTOLIN HFA) 90 mcg/act inhaler INHALE 2 PUFFS EVERY 6 HOURS AS NEEDED (WHEEZING, COUGH, SHORTNESS OF BREATH).      allopurinol (ZYLOPRIM) 300 mg tablet Take 300 mg by mouth daily  2    losartan (COZAAR) 50 mg tablet Take 50 mg  by mouth daily  1    Multiple Vitamins-Minerals (MULTIVITAMIN ADULT PO) DAILY      sildenafil (VIAGRA) 50 MG tablet TAKE ONE TABLET BY MOUTH AS NEEDED FOR ERECTILE DYSFUNCTION. 30 tablet 5     No current facility-administered medications on file prior to visit.      Social History     Tobacco Use    Smoking status: Never    Smokeless tobacco: Never   Substance and Sexual Activity    Alcohol use: Yes    Drug use: No    Sexual activity: Yes        Objective   /70 (BP Location: Left arm, Patient Position: Sitting, Cuff Size: Standard)   Pulse 72   Ht 6' (1.829 m)   Wt 117 kg (258 lb)   SpO2 98%   BMI 34.99 kg/m²     Physical Exam  Vitals reviewed.   Constitutional:       General: He is not in acute distress.     Appearance: Normal appearance. He is obese. He is not ill-appearing, toxic-appearing or diaphoretic.   HENT:      Head: Normocephalic and atraumatic.      Nose: Nose normal.      Mouth/Throat:      Mouth: Mucous membranes are moist.   Eyes:      Extraocular Movements: Extraocular movements intact.   Pulmonary:      Effort: Pulmonary effort is normal. No respiratory distress.   Abdominal:      Palpations: Abdomen is soft.   Genitourinary:     Penis: Normal.       Testes: Normal.      Prostate: Normal.      Rectum: Normal.      Comments: ANGIE 40 g a nodular nontender prostate normal anal verge normal rectum no palpable rectal masses.  Normal anal sphincter tone  Musculoskeletal:         General: Normal range of motion.      Cervical back: Neck supple.   Skin:     General: Skin is dry.   Neurological:      Mental Status: He is alert and oriented to person, place, and time.   Psychiatric:         Mood and Affect: Mood normal.         Behavior: Behavior normal.         Thought Content: Thought content normal.         Judgment: Judgment normal.           Results  Lab Results   Component Value Date    PSA 0.8 04/28/2023    PSA 1.0 03/24/2022     Lab Results   Component Value Date    CALCIUM 9.9 10/11/2024     "K 4.9 10/11/2024    CO2 28 10/11/2024    CL 98 (L) 10/11/2024    BUN 15 10/11/2024    CREATININE 0.91 10/11/2024     No results found for: \"WBC\", \"HGB\", \"HCT\", \"MCV\", \"PLT\"    Office Urine Dip  No results found for this or any previous visit (from the past hour).]      "